# Patient Record
Sex: FEMALE | Race: WHITE | Employment: STUDENT | URBAN - METROPOLITAN AREA
[De-identification: names, ages, dates, MRNs, and addresses within clinical notes are randomized per-mention and may not be internally consistent; named-entity substitution may affect disease eponyms.]

---

## 2020-09-29 ENCOUNTER — VIRTUAL VISIT (OUTPATIENT)
Dept: PRIMARY CARE CLINIC | Age: 25
End: 2020-09-29
Payer: COMMERCIAL

## 2020-09-29 DIAGNOSIS — N92.0 MENORRHAGIA WITH REGULAR CYCLE: ICD-10-CM

## 2020-09-29 DIAGNOSIS — Z76.0 MEDICATION REFILL: ICD-10-CM

## 2020-09-29 DIAGNOSIS — Z76.89 ENCOUNTER TO ESTABLISH CARE: ICD-10-CM

## 2020-09-29 DIAGNOSIS — N94.6 DYSMENORRHEA: Primary | ICD-10-CM

## 2020-09-29 PROCEDURE — 99203 OFFICE O/P NEW LOW 30 MIN: CPT | Performed by: NURSE PRACTITIONER

## 2020-09-29 RX ORDER — NORGESTIMATE AND ETHINYL ESTRADIOL 7DAYSX3 28
1 KIT ORAL DAILY
Qty: 3 DOSE PACK | Refills: 3 | Status: SHIPPED | OUTPATIENT
Start: 2020-09-29 | End: 2021-03-08 | Stop reason: SDUPTHER

## 2020-09-29 NOTE — PROGRESS NOTES
[facility]   [address], [facility]  P: [phone]  F: [phone]    SUBJECTIVE   Lauren Ovalles is a 23 y.o. female who is seen over telehealth for Establish Care and Medication Refill. HPI:    New Patient to establish care and for OCP refill. Endorses a history of dysmenorrhea and menorrhagia. PCP in [address] started her on trinessa tabs 0.35 norgestinate 0.18. - at age 18. Symptoms are under better control and she takes ibuprofen prn for cramps. PAP was normal in May 2020. Endorses seasonal allergies. Takes OTC antihistamines that work to control symptoms. 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 There are no active problems to display for this patient. Past Medical History:   Diagnosis Date    Dysmenorrhea     Menorrhagia      Past Surgical History:   Procedure Laterality Date    HX WISDOM TEETH EXTRACTION  2016     Social History     Socioeconomic History    Marital status: SINGLE     Spouse name: Not on file    Number of children: Not on file    Years of education: Not on file    Highest education level: Not on file   Occupational History    Not on file   Social Needs    Financial resource strain: Not on file    Food insecurity     Worry: Not on file     Inability: Not on file    Transportation needs     Medical: Not on file     Non-medical: Not on file   Tobacco Use    Smoking status: Never Smoker    Smokeless tobacco: Never Used   Substance and Sexual Activity    Alcohol use:  Yes     Alcohol/week: 1.0 standard drinks     Types: 1 Glasses of wine per week     Comment: social    Drug use: Never    Sexual activity: Not Currently   Lifestyle    Physical activity     Days per week: Not on file     Minutes per session: Not on file    Stress: Not on file   Relationships    Social connections     Talks on phone: Not on file     Gets together: Not on file     Attends Buddhism service: Not on file Active member of club or organization: Not on file     Attends meetings of clubs or organizations: Not on file     Relationship status: Not on file    Intimate partner violence     Fear of current or ex partner: Not on file     Emotionally abused: Not on file     Physically abused: Not on file     Forced sexual activity: Not on file   Other Topics Concern    Not on file   Social History Narrative    Not on file     Family History   Problem Relation Age of Onset    Diabetes Mother     No Known Problems Father     No Known Problems Sister     Diabetes Maternal Uncle      Not on File            The medications were reviewed and updated in the medical record. The past medical history, past surgical history, and family history were reviewed and updated in the medical record. REVIEW OF SYSTEMS   Review of Systems   Constitutional: Negative for chills and fever. Respiratory: Negative for shortness of breath and wheezing. Cardiovascular: Negative for chest pain and palpitations. Gastrointestinal: Negative for abdominal pain, constipation, diarrhea, nausea and vomiting. Endo/Heme/Allergies: Positive for environmental allergies. PHYSICAL EXAM   NO VITALS WERE TAKEN FOR THIS VISIT  Physical Exam  Constitutional:       General: She is not in acute distress. Appearance: Normal appearance. HENT:      Head: Normocephalic and atraumatic. Eyes:      General:         Right eye: No discharge. Left eye: No discharge. Pulmonary:      Effort: Pulmonary effort is normal. No respiratory distress. Neurological:      Mental Status: She is alert and oriented to person, place, and time. Psychiatric:         Attention and Perception: Attention normal.         Mood and Affect: Mood normal.         Speech: Speech normal.         Behavior: Behavior normal.           ASSESSMENT/ PLAN   Diagnoses and all orders for this visit:    1.  Dysmenorrhea  -     norgestimate-ethinyl estradioL Joanna Hurley 28,) 0.18/0.215/0.25 mg-35 mcg (28) tab; Take 1 Tab by mouth daily. Indications: pain with menstruation    2. Menorrhagia with regular cycle  -     norgestimate-ethinyl estradioL ([facility], 28,) 0.18/0.215/0.25 mg-35 mcg (28) tab; Take 1 Tab by mouth daily. Indications: pain with menstruation    3. Medication refill  -     norgestimate-ethinyl estradioL ([facility], 28,) 0.18/0.215/0.25 mg-35 mcg (28) tab; Take 1 Tab by mouth daily. Indications: pain with menstruation    4. Encounter to establish care          I was in the office while conducting this encounter. Consent:  She and/or her healthcare decision maker is aware that this patient-initiated Telehealth encounter is a billable service, with coverage as determined by her insurance carrier. She is aware that she may receive a bill and has provided verbal consent to proceed: Yes    This virtual visit was conducted via [employer]. Pursuant to the emergency declaration under the [facility], [facility] waiver authority and the [employer] Act, this Virtual  Visit was conducted to reduce the patient's risk of exposure to COVID-19 and provide continuity of care for an established patient. Services were provided through a video synchronous discussion virtually to substitute for in-person clinic visit. Due to this being a TeleHealth evaluation, many elements of the physical examination are unable to be assessed. Total Time: minutes: 11-20 minutes. Disclaimer:  Advised patient to call back or return to office if symptoms worsen/change/persist.  Discussed expected course/resolution/complications of diagnosis in detail with patient. Medication risks/benefits/alternatives discussed with patient. Patient was given an after visit summary which includes diagnoses, current medications, & vitals.       Discussed patient instructions and advised to read to all patient instructions regarding care. Patient expressed understanding with the diagnosis and plan. This note will not be viewable in [address]         Larissa Clement NP  9/29/2020        (This document has been electronically signed)

## 2021-01-29 ENCOUNTER — OFFICE VISIT (OUTPATIENT)
Dept: PRIMARY CARE CLINIC | Age: 26
End: 2021-01-29
Payer: COMMERCIAL

## 2021-01-29 VITALS
SYSTOLIC BLOOD PRESSURE: 133 MMHG | RESPIRATION RATE: 16 BRPM | TEMPERATURE: 97.6 F | HEART RATE: 90 BPM | HEIGHT: 65 IN | DIASTOLIC BLOOD PRESSURE: 88 MMHG | BODY MASS INDEX: 22.53 KG/M2 | WEIGHT: 135.2 LBS

## 2021-01-29 DIAGNOSIS — Z20.2 POSSIBLE EXPOSURE TO STD: ICD-10-CM

## 2021-01-29 DIAGNOSIS — R03.0 ELEVATED BLOOD PRESSURE READING IN OFFICE WITHOUT DIAGNOSIS OF HYPERTENSION: Primary | ICD-10-CM

## 2021-01-29 PROCEDURE — 99213 OFFICE O/P EST LOW 20 MIN: CPT | Performed by: NURSE PRACTITIONER

## 2021-01-29 NOTE — PROGRESS NOTES
[place] is a  23 y.o. female presents for visit. Chief Complaint   Patient presents with    Exposure to STD     std screening, patient wants a full screening, (she had appt with OB/GYN) 3 months ago, but it was virtual     HPI    Presents for sexually transmitted infection screenings. Reports a risk of exposure. Denies symptoms. OB/GYN care elsewhere. Blood pressure is mildly elevated in office today.-133/88. She does not smoke and BMI is 22. Review of Systems   Constitutional: Negative for chills, fever and malaise/fatigue. HENT: Negative for congestion and sore throat. Respiratory: Negative for cough and shortness of breath. Cardiovascular: Negative for chest pain. Gastrointestinal: Negative for diarrhea, heartburn and nausea. Musculoskeletal: Negative for myalgias. Neurological: Negative for headaches. Past Medical History:   Diagnosis Date    Dysmenorrhea     Menorrhagia      Past Surgical History:   Procedure Laterality Date    HX WISDOM TEETH EXTRACTION  2016       Social History     Socioeconomic History    Marital status: SINGLE     Spouse name: Not on file    Number of children: Not on file    Years of education: Not on file    Highest education level: Not on file   Occupational History    Not on file   Social Needs    Financial resource strain: Not on file    Food insecurity     Worry: Not on file     Inability: Not on file    Transportation needs     Medical: Not on file     Non-medical: Not on file   Tobacco Use    Smoking status: Never Smoker    Smokeless tobacco: Never Used   Substance and Sexual Activity    Alcohol use:  Yes     Alcohol/week: 1.0 standard drinks     Types: 1 Glasses of wine per week     Comment: social    Drug use: Never    Sexual activity: Not Currently   Lifestyle    Physical activity     Days per week: Not on file     Minutes per session: Not on file    Stress: Not on file   Relationships    Social connections     Talks on phone: Not on file     Gets together: Not on file     Attends Islam service: Not on file     Active member of club or organization: Not on file     Attends meetings of clubs or organizations: Not on file     Relationship status: Not on file    Intimate partner violence     Fear of current or ex partner: Not on file     Emotionally abused: Not on file     Physically abused: Not on file     Forced sexual activity: Not on file   Other Topics Concern    Not on file   Social History Narrative    Not on file       Family History   Problem Relation Age of Onset    Diabetes Mother     No Known Problems Father     No Known Problems Sister     Diabetes Maternal Uncle       Prior to Admission medications    Medication Sig Start Date End Date Taking? Authorizing Provider   norgestimate-ethinyl estradioL ([facility], 28,) 0.18/0.215/0.25 mg-35 mcg (28) tab Take 1 Tab by mouth daily. Indications: pain with menstruation 9/29/20  Yes Kortney Mi NP      Not on File     Visit Vitals  /88 (BP 1 Location: Left upper arm, BP Patient Position: Sitting)   Pulse 90   Temp 97.6 °F (36.4 °C) (Temporal)   Resp 16   Ht 5' 5\" (1.651 m)   Wt 135 lb 3.2 oz (61.3 kg)   LMP 01/26/2021   BMI 22.50 kg/m²     Physical Exam  Vitals signs and nursing note reviewed. Constitutional:       General: She is not in acute distress. Appearance: She is well-developed. HENT:      Head: Normocephalic and atraumatic. Right Ear: External ear normal.      Left Ear: External ear normal.   Eyes:      Conjunctiva/sclera: Conjunctivae normal.   Cardiovascular:      Rate and Rhythm: Normal rate and regular rhythm. Heart sounds: Normal heart sounds. Pulmonary:      Effort: Pulmonary effort is normal.      Breath sounds: Normal breath sounds. No wheezing. Skin:     General: Skin is warm and dry. Neurological:      Mental Status: She is alert and oriented to person, place, and time.    Psychiatric:         Mood and Affect: Mood normal.         Speech: Speech normal.         Behavior: Behavior normal.               No results found for this or any previous visit (from the past 24 hour(s)). There are no active problems to display for this patient. ASSESSMENT AND PLAN:      ICD-10-CM ICD-9-CM   1. Elevated blood pressure reading in office without diagnosis of hypertension  R03.0 796.2   2. Possible exposure to STD  Z20.2 V01.6     Orders Placed This Encounter    CT/NG/T.VAGINALIS AMPLIFICATION     Standing Status:   Future     Number of Occurrences:   1     Standing Expiration Date:   1/29/2022     Order Specific Question:   Specimen source     Answer:   Urine [258]    HSV 1 AND 2 ABS, IGM     Standing Status:   Future     Number of Occurrences:   1     Standing Expiration Date:   1/29/2022     Diagnoses and all orders for this visit:    1. Elevated blood pressure reading in office without diagnosis of hypertension    2. Possible exposure to STD  -     CT/NG/T.VAGINALIS AMPLIFICATION; Future  -     HSV 1 AND 2 ABS, IGM; Future        lab results and schedule of future lab studies reviewed with patient  Check blood pressure at home. Follow-up and Dispositions    · Return if symptoms worsen or fail to improve. Disclaimer:  Advised her to call back or return to office if symptoms worsen/change/persist.  Discussed expected course/resolution/complications of diagnosis in detail with patient. Medication risks/benefits/alternatives discussed with patient. She was given an after visit summary which includes diagnoses, current medications, & vitals. Discussed patient instructions and advised to read to all patient instructions regarding care. She expressed understanding with the diagnosis and plan.

## 2021-01-29 NOTE — PROGRESS NOTES
Chief Complaint   Patient presents with    Exposure to STD     std screening, patient wants a full screening, (she had appt with OB/GYN) 3 months ago, but it was virtual     1. Have you been to the ER, urgent care clinic since your last visit? Hospitalized since your last visit? No    2. Have you seen or consulted any other health care providers outside of the [address] [place] [address] since your last visit? Include any pap smears or colon screening.  No

## 2021-02-11 LAB
C TRACH RRNA SPEC QL NAA+PROBE: NEGATIVE
HSV1 IGM TITR SER IF: NORMAL TITER
HSV2 IGM TITR SER IF: NORMAL TITER
N GONORRHOEA RRNA SPEC QL NAA+PROBE: NEGATIVE
T VAGINALIS DNA SPEC QL NAA+PROBE: NEGATIVE

## 2021-03-08 DIAGNOSIS — N92.0 MENORRHAGIA WITH REGULAR CYCLE: ICD-10-CM

## 2021-03-08 DIAGNOSIS — Z76.0 MEDICATION REFILL: ICD-10-CM

## 2021-03-08 DIAGNOSIS — N94.6 DYSMENORRHEA: ICD-10-CM

## 2021-03-08 RX ORDER — NORGESTIMATE AND ETHINYL ESTRADIOL 7DAYSX3 28
1 KIT ORAL DAILY
Qty: 3 DOSE PACK | Refills: 3 | Status: SHIPPED | OUTPATIENT
Start: 2021-03-08 | End: 2021-08-02
